# Patient Record
Sex: FEMALE | HISPANIC OR LATINO | Employment: FULL TIME | URBAN - METROPOLITAN AREA
[De-identification: names, ages, dates, MRNs, and addresses within clinical notes are randomized per-mention and may not be internally consistent; named-entity substitution may affect disease eponyms.]

---

## 2022-04-08 ENCOUNTER — HOSPITAL ENCOUNTER (EMERGENCY)
Facility: HOSPITAL | Age: 47
Discharge: HOME | End: 2022-04-08
Attending: EMERGENCY MEDICINE | Admitting: EMERGENCY MEDICINE

## 2022-04-08 VITALS
OXYGEN SATURATION: 100 % | SYSTOLIC BLOOD PRESSURE: 143 MMHG | WEIGHT: 135 LBS | RESPIRATION RATE: 17 BRPM | HEART RATE: 100 BPM | DIASTOLIC BLOOD PRESSURE: 78 MMHG | TEMPERATURE: 98.4 F

## 2022-04-08 DIAGNOSIS — L03.90 CELLULITIS: Primary | ICD-10-CM

## 2022-04-08 PROCEDURE — 99284 EMERGENCY DEPT VISIT MOD MDM: CPT

## 2022-04-08 PROCEDURE — 99283 EMERGENCY DEPT VISIT LOW MDM: CPT

## 2022-04-08 PROCEDURE — 10160 PNXR ASPIR ABSC HMTMA BULLA: CPT

## 2022-04-08 RX ORDER — CEPHALEXIN 250 MG/1
500 CAPSULE ORAL ONCE
Status: COMPLETED | OUTPATIENT
Start: 2022-04-08 | End: 2022-04-08

## 2022-04-08 RX ORDER — CEPHALEXIN 500 MG/1
500 CAPSULE ORAL EVERY 6 HOURS SCHEDULED
Qty: 28 CAPSULE | Refills: 0 | Status: SHIPPED | OUTPATIENT
Start: 2022-04-08 | End: 2022-04-15

## 2022-04-08 RX ADMIN — CEPHALEXIN 500 MG: 250 CAPSULE ORAL at 21:02

## 2022-04-08 NOTE — Clinical Note
Kanika Garland was seen and treated in our emergency department on 4/8/2022  No restrictions            Diagnosis:     Joaquin Spain    She may return on this date: If you have any questions or concerns, please don't hesitate to call        Camden Cervantes PA-C    ______________________________           _______________          _______________  Hospital Representative                              Date                                Time

## 2022-04-09 NOTE — ED NOTES
AVS reviewed with pt by provider, pt verbalized understanding of d/c instructions  New medications reviewed  No questions or concerns at this time  VSS   Pt left ambulatory with steady gait; alert and oriented     Cathie Bowen RN  04/08/22 [id number]

## 2022-04-09 NOTE — ED PROVIDER NOTES
History  Chief Complaint   Patient presents with    Hand Injury     cut left hand on post card one week ago, reports swelling, no streaking noted     Patient is a 71-year-old female stating that she cut her left hand on a post cardiac proximally a week ago, now has swelling and redness around the paper cut  Patient does state that she is a diabetic, denies loss of range of motion, loss of feeling, or any other symptoms at this time      History provided by:  Patient   used: No    Hand Injury  Location:  Hand  Hand location:  L hand  Injury: no    Associated symptoms: swelling    Associated symptoms: no back pain, no decreased range of motion, no fatigue, no fever, no muscle weakness, no neck pain, no numbness and no tingling        None       No past medical history on file  Past Surgical History:   Procedure Laterality Date     SECTION      4       No family history on file  I have reviewed and agree with the history as documented  E-Cigarette/Vaping     E-Cigarette/Vaping Substances     Social History     Tobacco Use    Smoking status: Never Smoker    Smokeless tobacco: Not on file   Substance Use Topics    Alcohol use: Not Currently    Drug use: Not Currently       Review of Systems   Constitutional: Negative  Negative for activity change, appetite change, fatigue and fever  HENT: Negative  Negative for ear pain and trouble swallowing  Eyes: Negative  Respiratory: Negative  Negative for chest tightness and shortness of breath  Cardiovascular: Negative  Gastrointestinal: Negative  Genitourinary: Negative  Musculoskeletal: Negative  Negative for back pain and neck pain  Skin: Positive for wound  Neurological: Negative  Psychiatric/Behavioral: Negative  Physical Exam  Physical Exam  Vitals reviewed  Constitutional:       Appearance: Normal appearance  She is normal weight  HENT:      Head: Normocephalic and atraumatic        Right Ear: External ear normal       Left Ear: External ear normal       Nose: Nose normal    Eyes:      General:         Right eye: No discharge  Left eye: No discharge  Conjunctiva/sclera: Conjunctivae normal    Cardiovascular:      Rate and Rhythm: Normal rate  Pulses: Normal pulses  Pulmonary:      Effort: Pulmonary effort is normal    Musculoskeletal:         General: Normal range of motion  Right hand: Normal       Left hand: Tenderness present  No bony tenderness  Normal strength  Normal sensation  Hands:       Cervical back: Normal range of motion  Comments: Swelling, erythema, and tenderness in the above indicated area  No red streaking noted   Skin:     General: Skin is warm and dry  Findings: Erythema present  Neurological:      Mental Status: She is alert  Vital Signs  ED Triage Vitals   Temperature Pulse Respirations Blood Pressure SpO2   04/08/22 1930 04/08/22 1930 04/08/22 1930 04/08/22 1930 04/08/22 1930   98 4 °F (36 9 °C) (!) 107 17 159/74 100 %      Temp Source Heart Rate Source Patient Position - Orthostatic VS BP Location FiO2 (%)   04/08/22 2109 04/08/22 1930 04/08/22 1930 04/08/22 1930 --   Oral Monitor Sitting Right arm       Pain Score       04/08/22 1930       3           Vitals:    04/08/22 1930 04/08/22 2109   BP: 159/74 143/78   Pulse: (!) 107 100   Patient Position - Orthostatic VS: Sitting          Visual Acuity      ED Medications  Medications   cephalexin (KEFLEX) capsule 500 mg (500 mg Oral Given 4/8/22 2102)       Diagnostic Studies  Results Reviewed     None                 No orders to display              Procedures  Incision and drain    Date/Time: 4/8/2022 10:24 PM  Performed by: Lisy Arndt PA-C  Authorized by: Lisy Arndt PA-C   Universal Protocol:  Consent: Verbal consent obtained    Risks and benefits: risks, benefits and alternatives were discussed  Consent given by: patient  Time out: Immediately prior to procedure a "time out" was called to verify the correct patient, procedure, equipment, support staff and site/side marked as required  Patient understanding: patient states understanding of the procedure being performed  Patient consent: the patient's understanding of the procedure matches consent given  Procedure consent: procedure consent matches procedure scheduled  Patient identity confirmed: verbally with patient      Patient location:  ED  Location:     Type:  Abscess    Size:  1 cm  Procedure details:     Complexity:  Simple    Needle aspiration: yes      Needle size:  25 G    Incision types:  Stab incision    Drainage:  Bloody    Drainage amount:  Scant    Wound treatment:  Wound left open    Packing materials:  None  Post-procedure details:     Patient tolerance of procedure: Tolerated well, no immediate complications             ED Course                                             MDM  Number of Diagnoses or Management Options  Cellulitis: new and does not require workup  Diagnosis management comments: Patient presented approximately 1 week after paper cut  Patient is diabetic  Swelling, erythema, and tenderness noted exam   Potential abscess was noted, poke with needle, but no discharge the signs blood  Patient was stable, wound cleaned and discharged home with Keflex    Counseling: I had a detailed discussion with the patient and/or guardian regarding: the historical points, exam findings, and any diagnostic results supporting the discharge diagnosis, lab results, radiology results, discharge instructions reviewed with patient and/or family/caregiver and understanding was verbalized   Instructions given to return to the emergency department if symptoms worsen or persist, or if there are any questions or concerns that arise at home       All labs reviewed and utilized in the medical decision making process     All radiology studies independently viewed by me and interpreted by the radiologist     Austin Sheffield of the record may have been created with voice recognition software   Occasional wrong word or "sound a like" substitutions may have occurred due to the inherent limitations of voice recognition software   Read the chart carefully and recognize, using context, where substitutions have occurred  Risk of Complications, Morbidity, and/or Mortality  Presenting problems: minimal  Diagnostic procedures: minimal  Management options: minimal    Patient Progress  Patient progress: stable      Disposition  Final diagnoses:   Cellulitis     Time reflects when diagnosis was documented in both MDM as applicable and the Disposition within this note     Time User Action Codes Description Comment    4/8/2022  8:57 PM Kwame Ball Add [L03 90] Cellulitis       ED Disposition     ED Disposition Condition Date/Time Comment    Discharge Stable Fri Apr 8, 2022  8:57 PM Juvencio uLjan discharge to home/self care  Follow-up Information     Follow up With Specialties Details Why [address] [facility] Emergency Department Emergency Medicine  As needed, If symptoms worsen [place] [id number]  [address] [facility] Emergency Department, [address] [facility] , [address], located within [facility], [address] [facility], [address]          Discharge Medication List as of 4/8/2022  8:58 PM      START taking these medications    Details   cephalexin (KEFLEX) 500 mg capsule Take 1 capsule (500 mg total) by mouth every 6 (six) hours for 7 days, Starting Fri 4/8/2022, Until Fri 4/15/2022, Normal             No discharge procedures on file      PDMP Review     None          ED Provider  Electronically Signed by           Joseph Dc PA-C  04/08/22 8594

## 2023-12-29 ENCOUNTER — TELEPHONE (OUTPATIENT)
Dept: HEMATOLOGY ONCOLOGY | Facility: CLINIC | Age: 48
End: 2023-12-29

## 2023-12-29 NOTE — TELEPHONE ENCOUNTER
"\"Hi, I am calling from dermatology, dermatology centers. I'm calling to refer a patient to you guys. If someone can please get in touch with me. The phone number here is [phone]. The name of patient of the patient is Dale Moon. I'm sorry. And his date of birth is one second. My computer is frozen 12/1/1971. You can ask for Wilda when you call and then I could speak with you more about the matter. Thank you so much. Have a great day. Vivienne.\"  "

## 2024-03-12 ENCOUNTER — HOSPITAL ENCOUNTER (EMERGENCY)
Facility: HOSPITAL | Age: 49
Discharge: HOME | End: 2024-03-12
Attending: EMERGENCY MEDICINE | Admitting: EMERGENCY MEDICINE

## 2024-03-12 VITALS
DIASTOLIC BLOOD PRESSURE: 81 MMHG | OXYGEN SATURATION: 98 % | TEMPERATURE: 97.1 F | SYSTOLIC BLOOD PRESSURE: 157 MMHG | RESPIRATION RATE: 18 BRPM | HEART RATE: 107 BPM

## 2024-03-12 DIAGNOSIS — R21 RASH AND NONSPECIFIC SKIN ERUPTION: Primary | ICD-10-CM

## 2024-03-12 LAB
ANION GAP SERPL CALCULATED.3IONS-SCNC: 8 MMOL/L (ref 4–13)
BASOPHILS # BLD AUTO: 0.1 THOUSANDS/ÂΜL (ref 0–0.1)
BASOPHILS NFR BLD AUTO: 1 % (ref 0–1)
BUN SERPL-MCNC: 17 MG/DL (ref 5–25)
CALCIUM SERPL-MCNC: 8.9 MG/DL (ref 8.4–10.2)
CHLORIDE SERPL-SCNC: 99 MMOL/L (ref 96–108)
CO2 SERPL-SCNC: 24 MMOL/L (ref 21–32)
CREAT SERPL-MCNC: 0.98 MG/DL (ref 0.6–1.3)
EOSINOPHIL # BLD AUTO: 1.05 THOUSAND/ÂΜL (ref 0–0.61)
EOSINOPHIL NFR BLD AUTO: 10 % (ref 0–6)
ERYTHROCYTE [DISTWIDTH] IN BLOOD BY AUTOMATED COUNT: 14.9 % (ref 11.6–15.1)
ERYTHROCYTE [SEDIMENTATION RATE] IN BLOOD: 56 MM/HOUR (ref 0–19)
GFR SERPL CREATININE-BSD FRML MDRD: 68 ML/MIN/1.73SQ M
GLUCOSE SERPL-MCNC: 363 MG/DL (ref 65–140)
HCT VFR BLD AUTO: 39.2 % (ref 34.8–46.1)
HGB BLD-MCNC: 12.4 G/DL (ref 11.5–15.4)
IMM GRANULOCYTES # BLD AUTO: 0.02 THOUSAND/UL (ref 0–0.2)
IMM GRANULOCYTES NFR BLD AUTO: 0 % (ref 0–2)
LYMPHOCYTES # BLD AUTO: 3.31 THOUSANDS/ÂΜL (ref 0.6–4.47)
LYMPHOCYTES NFR BLD AUTO: 31 % (ref 14–44)
MCH RBC QN AUTO: 24.4 PG (ref 26.8–34.3)
MCHC RBC AUTO-ENTMCNC: 31.6 G/DL (ref 31.4–37.4)
MCV RBC AUTO: 77 FL (ref 82–98)
MONOCYTES # BLD AUTO: 0.5 THOUSAND/ÂΜL (ref 0.17–1.22)
MONOCYTES NFR BLD AUTO: 5 % (ref 4–12)
NEUTROPHILS # BLD AUTO: 5.87 THOUSANDS/ÂΜL (ref 1.85–7.62)
NEUTS SEG NFR BLD AUTO: 53 % (ref 43–75)
NRBC BLD AUTO-RTO: 0 /100 WBCS
PLATELET # BLD AUTO: 462 THOUSANDS/UL (ref 149–390)
PMV BLD AUTO: 10.2 FL (ref 8.9–12.7)
POTASSIUM SERPL-SCNC: 4.4 MMOL/L (ref 3.5–5.3)
RBC # BLD AUTO: 5.08 MILLION/UL (ref 3.81–5.12)
SODIUM SERPL-SCNC: 131 MMOL/L (ref 135–147)
WBC # BLD AUTO: 10.85 THOUSAND/UL (ref 4.31–10.16)

## 2024-03-12 PROCEDURE — 99283 EMERGENCY DEPT VISIT LOW MDM: CPT

## 2024-03-12 PROCEDURE — 80048 BASIC METABOLIC PNL TOTAL CA: CPT

## 2024-03-12 PROCEDURE — 96374 THER/PROPH/DIAG INJ IV PUSH: CPT

## 2024-03-12 PROCEDURE — 99284 EMERGENCY DEPT VISIT MOD MDM: CPT | Performed by: EMERGENCY MEDICINE

## 2024-03-12 PROCEDURE — 36415 COLL VENOUS BLD VENIPUNCTURE: CPT

## 2024-03-12 PROCEDURE — 85025 COMPLETE CBC W/AUTO DIFF WBC: CPT

## 2024-03-12 PROCEDURE — 85652 RBC SED RATE AUTOMATED: CPT

## 2024-03-12 RX ORDER — DIPHENHYDRAMINE HYDROCHLORIDE 50 MG/ML
25 INJECTION INTRAMUSCULAR; INTRAVENOUS ONCE
Status: COMPLETED | OUTPATIENT
Start: 2024-03-12 | End: 2024-03-12

## 2024-03-12 RX ADMIN — DIPHENHYDRAMINE HYDROCHLORIDE 25 MG: 50 INJECTION, SOLUTION INTRAMUSCULAR; INTRAVENOUS at 21:52

## 2024-03-12 RX ADMIN — DEXAMETHASONE 10 MG: 2 TABLET ORAL at 22:39

## 2024-03-13 NOTE — DISCHARGE INSTRUCTIONS
You were evaluated in the Emergency Department today for a rash.    Please follow up with your primary care physician within 2-3 days. You need to discuss your blood sugar which was high.    Please also follow up with dermatology. For which I placed a referral.    Return to the Emergency Department if you experience worsening or spreading rash, worsening or uncontrolled pain, fevers 100.4°F or greater, recurrent vomiting, shortness of breath, discharge from your rash, or any other concerning symptoms.    Thank you for choosing us for your care.

## 2024-03-13 NOTE — ED PROVIDER NOTES
History  Chief Complaint   Patient presents with    Rash     Pt developed a rash on her face within the week, has used various OTC creams w/o relief. C/o extreme itchiness. Rash is also generalized on arms and stomach, red and raised in appearance. Pt denies SOB      Patient is a 48-year-old female with a significant past medical history of diabetes, eczema, presenting for evaluation of a rash.  She reports that she has a chronic rash over her body that has previously been evaluated by a dermatologist, gotten biopsies, and determined to be a form of eczema.  She reports that she has been dealing with this rash for years, however has not gotten any sort of follow-up as of late.  She reports that more recently she has had some increased pruritus to her entire body, but predominantly over her face.  She has noticed some erythema of her face but not in her mucous membranes.  She says that this is similar to previous, however it is usually localized to her body and not as much on her face.  She denies any difficulty breathing or throat swelling.  She denies any abdominal pain, nausea, vomiting, diarrhea.  She denies any new medications, foods, contacts, or irritants.  She has been trying some emollients as well as Benadryl for her symptoms with minimal relief.  She says that she has not been to a doctor for a while now due to some insurance issues and is not taking most of her medications.        None       History reviewed. No pertinent past medical history.    Past Surgical History:   Procedure Laterality Date     SECTION      4       History reviewed. No pertinent family history.  I have reviewed and agree with the history as documented.    E-Cigarette/Vaping     E-Cigarette/Vaping Substances     Social History     Tobacco Use    Smoking status: Never   Substance Use Topics    Alcohol use: Not Currently    Drug use: Not Currently        Review of Systems   Constitutional:  Negative for fever.   Skin:  Positive  for rash.   Neurological:  Negative for weakness.       Physical Exam  ED Triage Vitals [03/12/24 2048]   Temperature Pulse Respirations Blood Pressure SpO2   (!) 97.1 °F (36.2 °C) (!) 107 18 157/81 98 %      Temp Source Heart Rate Source Patient Position - Orthostatic VS BP Location FiO2 (%)   Oral Monitor -- Right arm --      Pain Score       --             Orthostatic Vital Signs  Vitals:    03/12/24 2048   BP: 157/81   Pulse: (!) 107       Physical Exam  Vitals and nursing note reviewed.   Constitutional:       General: She is not in acute distress.     Appearance: Normal appearance. She is not ill-appearing or toxic-appearing.   HENT:      Head: Normocephalic and atraumatic.      Right Ear: External ear normal.      Left Ear: External ear normal.      Nose: Nose normal.      Mouth/Throat:      Comments: There is no erythema or exudates. No tonsillar swelling or pus. The uvula is midline without deviation or edema. There is no soft palate swelling.   Eyes:      General: No scleral icterus.        Right eye: No discharge.         Left eye: No discharge.      Extraocular Movements: Extraocular movements intact.      Conjunctiva/sclera: Conjunctivae normal.      Comments: There is darkening under the patient's bilateral eyes which the patient states is chronic but appears slightly worse than her norm   Cardiovascular:      Rate and Rhythm: Normal rate.      Heart sounds: Normal heart sounds. No murmur heard.     No friction rub. No gallop.   Pulmonary:      Effort: Pulmonary effort is normal. No respiratory distress.      Breath sounds: Normal breath sounds.   Abdominal:      General: Abdomen is flat. There is no distension.      Palpations: Abdomen is soft. There is no mass.      Tenderness: There is no abdominal tenderness.   Genitourinary:     Comments: Deferred  Skin:     General: Skin is warm and dry.      Findings: Rash present.      Comments: There are diffuse excoriations as outlined in the media.  There is  some faint erythema of the patient's face.   Neurological:      General: No focal deficit present.      Mental Status: She is alert.                           ED Medications  Medications   diphenhydrAMINE (BENADRYL) injection 25 mg (25 mg Intravenous Given 3/12/24 2152)   dexamethasone (DECADRON) tablet 10 mg (10 mg Oral Given 3/12/24 2239)       Diagnostic Studies  Results Reviewed       Procedure Component Value Units Date/Time    Basic metabolic panel [[id number]]  (Abnormal) Collected: 03/12/24 2153    Lab Status: Final result Specimen: Blood from Hand, Right Updated: 03/12/24 2225     Sodium 131 mmol/L      Potassium 4.4 mmol/L      Chloride 99 mmol/L      CO2 24 mmol/L      ANION GAP 8 mmol/L      BUN 17 mg/dL      Creatinine 0.98 mg/dL      Glucose 363 mg/dL      Calcium 8.9 mg/dL      eGFR 68 ml/min/1.73sq m     Narrative:      National Kid[employer] Disease Foundation guidelines for Chronic Kidney Disease (CKD):     Stage 1 with normal or high GFR (GFR > 90 mL/min/1.73 square meters)    Stage 2 Mild CKD (GFR = 60-89 mL/min/1.73 square meters)    Stage 3A Moderate CKD (GFR = 45-59 mL/min/1.73 square meters)    Stage 3B Moderate CKD (GFR = 30-44 mL/min/1.73 square meters)    Stage 4 Severe CKD (GFR = 15-29 mL/min/1.73 square meters)    Stage 5 End Stage CKD (GFR <15 mL/min/1.73 square meters)  Note: GFR calculation is accurate only with a steady state creatinine    Sedimentation rate, automated [[id number]]  (Abnormal) Collected: 03/12/24 2153    Lab Status: Final result Specimen: Blood from Hand, Right Updated: 03/12/24 2212     Sed Rate 56 mm/hour     CBC and differential [[id number]]  (Abnormal) Collected: 03/12/24 2153    Lab Status: Final result Specimen: Blood from Hand, Right Updated: 03/12/24 2204     WBC 10.85 Thousand/uL      RBC 5.08 Million/uL      Hemoglobin 12.4 g/dL      Hematocrit 39.2 %      MCV 77 fL      MCH 24.4 pg      MCHC 31.6 g/dL      RDW 14.9 %      MPV 10.2 fL      Platelets 462 Thousands/uL       nRBC 0 /100 WBCs      Neutrophils Relative 53 %      Immature Grans % 0 %      Lymphocytes Relative 31 %      Monocytes Relative 5 %      Eosinophils Relative 10 %      Basophils Relative 1 %      Neutrophils Absolute 5.87 Thousands/µL      Absolute Immature Grans 0.02 Thousand/uL      Absolute Lymphocytes 3.31 Thousands/µL      Absolute Monocytes 0.50 Thousand/µL      Eosinophils Absolute 1.05 Thousand/µL      Basophils Absolute 0.10 Thousands/µL                    No orders to display         Procedures  Procedures      ED Course                                       Medical Decision Making  Patient with history as above presented with a rash. History obtained from patient.    Differential diagnosis includes: eczema, contact dermatitis, dermatomyositis, rash associated with ESRD    Plan: CBC, BMP, ESR, benadryl    Reviewed external records. Labs reviewed, and remarkable for a mildly elevated ESR, hyperglycemia, as well as notably unremarkable creatinine.  Will give a single dose of Decadron here, however given her hyperglycemia we will avoid giving longer course of steroids. Patient was treated with above with improvement in symptoms.  Rash remains undifferentiated, however does not appear emergent in nature.  Stable for outpatient management.    Disposition: Discharged with instructions to obtain outpatient follow up of patient's symptoms and findings, with strict return precautions if patient develops new or worsening symptoms.  Instructed on the importance of primary care follow-up, specifically regarding her hyperglycemia.  Instructed on the importance of dermatology follow-up, for which an ambulatory referral was placed.  Patient understands this plan and is agreeable. All questions answered. Patient discharged home with return precautions.    Amount and/or Complexity of Data Reviewed  Labs: ordered.    Risk  Prescription drug management.          Disposition  Final diagnoses:   Rash and nonspecific skin  eruption     Time reflects when diagnosis was documented in both MDM as applicable and the Disposition within this note       Time User Action Codes Description Comment    3/12/2024 10:37 PM Hair Cevallos Add [R21] Rash and nonspecific skin eruption           ED Disposition       ED Disposition   Discharge    Condition   Stable    Date/Time   Tue Mar 12, 2024 10:37 PM    Comment   Sarah Moon discharge to home/self care.                   Follow-up Information       Follow up With Specialties Details Why Contact Info    [employer]  Call  To find a primary care doctor [phone]              There are no discharge medications for this patient.        PDMP Review       None             ED Provider  Attending physically available and evaluated Sarah Moon. I managed the patient along with the ED Attending.    Electronically Signed by           Hair Cevallos DO  03/13/24 7423

## 2024-03-13 NOTE — ED ATTENDING ATTESTATION
"I, Phil Paris MD, saw and evaluated the patient. I have discussed the patient with the resident and agree with the resident's findings, Plan of Care, and MDM as documented in the resident's note, except where noted. All available labs and Radiology studies were reviewed.  I was present for key portions of any procedure(s) performed by the resident and I was immediately available to provide assistance.    At this point I agree with the current assessment done in the Emergency Department.  I have conducted an independent evaluation of this patient a history and physical is as follows:    47 yo female with a history of eczema and DM presents to the ED complaining of a generalized pruritic rash x \"years\". The patient says she has seen several different dermatologists and undergone biopsies of the rash in the past. \"They say it's some kind of eczema\". She does not currently follow up with a dermatologist so she came to the ED because the rash is \"flaring again\". No shortness of breath, wheezing, voice change, or difficulty swallowing. Rash is consistent with her usual rash except it is more concentrated on her face. No nausea, vomiting, abdominal pain, fevers, or chills. No new exposures. Minimal relief with OTC Bendaryl. No other specific complaints.     ROS: per resident physician note    Gen: NAD, AA&Ox3  HEENT: PERRL, EOMI, (+) mmm  Neck: supple, no stridor  CV: RRR  Lungs: CTA B/L  Abdomen: soft, NT/ND  Ext: no swelling or deformity  Neuro: 5/5 strength all extremities, sensation grossly intact  Skin: (+) diffuse erythematous papular rash with overlying excoriations, no tenderness, no warmth, no drainage, no pustules or vesicles (See MEDIA)     ED Course  The patient is comfortable appearing with stable vital signs. Rash is reportedly a very chronic issue and mostly consistent with past \"flares\". Will check basic labs and inflammatory markers. Decadron and Benadryl administered. Will continue to monitor in " the ED. Disposition per workup and reassessment.      Critical Care Time  Procedures